# Patient Record
Sex: MALE | HISPANIC OR LATINO | Employment: FULL TIME | URBAN - METROPOLITAN AREA
[De-identification: names, ages, dates, MRNs, and addresses within clinical notes are randomized per-mention and may not be internally consistent; named-entity substitution may affect disease eponyms.]

---
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
## 2023-01-04 NOTE — TELEPHONE ENCOUNTER
Called mom and left a voicemail regarding x-ray needing to be done as a walk in basis on 12/28/20 as no appointment can be scheduled at the time it is needed. Left a call back number of [phone]    elderly f bib family for agitation, worsening dementia/ms. hx of UTIs. no trauma.

## 2023-07-24 NOTE — PROGRESS NOTES
Mother and father at bedside at change of shift. Pt down to CT at 1930. Pt back to floor at 2015. Pt given medication prior to CT and when arrived back to floor pt continues to complain of pain. Pt provided a with a heat pack, once reassessed pt states that pain has gone away. Discussed plan of care and all questions answered at this time.    No

## 2023-07-25 DIAGNOSIS — R63.5 EXCESSIVE BODY WEIGHT GAIN: ICD-10-CM

## 2023-07-25 DIAGNOSIS — Z92.3 PERSONAL HISTORY OF RADIATION THERAPY: ICD-10-CM

## 2023-07-25 DIAGNOSIS — Z85.71 HISTORY OF NODULAR SCLEROSIS HODGKIN'S DISEASE: ICD-10-CM

## 2023-08-02 NOTE — CARE PLAN
Problem: Infection  Goal: Will remain free from infection  Afebrile. [place] accessed.     Problem: Discharge Barriers/Planning  Goal: Patient's continuum of care needs will be met  Discharge instructions and follow up appointments discussed with family.        Yes

## 2023-08-25 ENCOUNTER — HOSPITAL ENCOUNTER (OUTPATIENT)
Dept: RADIOLOGY | Facility: MEDICAL CENTER | Age: 18
End: 2023-08-25
Attending: PEDIATRICS
Payer: COMMERCIAL

## 2023-08-25 DIAGNOSIS — Z85.71 HISTORY OF NODULAR SCLEROSIS HODGKIN'S DISEASE: ICD-10-CM

## 2023-08-25 PROCEDURE — 71046 X-RAY EXAM CHEST 2 VIEWS: CPT

## 2023-08-28 ENCOUNTER — HOSPITAL ENCOUNTER (OUTPATIENT)
Facility: MEDICAL CENTER | Age: 18
End: 2023-08-28
Attending: PEDIATRICS
Payer: COMMERCIAL

## 2023-08-28 ENCOUNTER — HOSPITAL ENCOUNTER (OUTPATIENT)
Dept: PEDIATRIC HEMATOLOGY/ONCOLOGY | Facility: MEDICAL CENTER | Age: 18
End: 2023-08-28
Attending: PEDIATRICS
Payer: COMMERCIAL

## 2023-08-28 VITALS
BODY MASS INDEX: 36.38 KG/M2 | DIASTOLIC BLOOD PRESSURE: 82 MMHG | HEART RATE: 73 BPM | WEIGHT: 245.59 LBS | OXYGEN SATURATION: 98 % | SYSTOLIC BLOOD PRESSURE: 133 MMHG | TEMPERATURE: 98.5 F | HEIGHT: 69 IN

## 2023-08-28 DIAGNOSIS — Z85.71 HISTORY OF NODULAR SCLEROSIS HODGKIN'S DISEASE: Chronic | ICD-10-CM

## 2023-08-28 DIAGNOSIS — R63.5 EXCESSIVE BODY WEIGHT GAIN: ICD-10-CM

## 2023-08-28 LAB
ALBUMIN SERPL BCP-MCNC: 4.7 G/DL (ref 3.2–4.9)
ALBUMIN/GLOB SERPL: 1.5 G/DL
ALP SERPL-CCNC: 86 U/L (ref 30–99)
ALT SERPL-CCNC: 19 U/L (ref 2–50)
ANION GAP SERPL CALC-SCNC: 11 MMOL/L (ref 7–16)
AST SERPL-CCNC: 23 U/L (ref 12–45)
BASOPHILS # BLD AUTO: 0.8 % (ref 0–1.8)
BASOPHILS # BLD: 0.05 K/UL (ref 0–0.12)
BILIRUB SERPL-MCNC: 0.4 MG/DL (ref 0.1–1.5)
BUN SERPL-MCNC: 17 MG/DL (ref 8–22)
CALCIUM ALBUM COR SERPL-MCNC: 9.2 MG/DL (ref 8.5–10.5)
CALCIUM SERPL-MCNC: 9.8 MG/DL (ref 8.5–10.5)
CHLORIDE SERPL-SCNC: 104 MMOL/L (ref 96–112)
CO2 SERPL-SCNC: 24 MMOL/L (ref 20–33)
CREAT SERPL-MCNC: 1.08 MG/DL (ref 0.5–1.4)
EOSINOPHIL # BLD AUTO: 0.27 K/UL (ref 0–0.51)
EOSINOPHIL NFR BLD: 4.1 % (ref 0–6.9)
ERYTHROCYTE [DISTWIDTH] IN BLOOD BY AUTOMATED COUNT: 40 FL (ref 35.9–50)
ERYTHROCYTE [SEDIMENTATION RATE] IN BLOOD BY WESTERGREN METHOD: 7 MM/HOUR (ref 0–20)
GFR SERPLBLD CREATININE-BSD FMLA CKD-EPI: 100 ML/MIN/1.73 M 2
GLOBULIN SER CALC-MCNC: 3.1 G/DL (ref 1.9–3.5)
GLUCOSE SERPL-MCNC: 85 MG/DL (ref 65–99)
HCT VFR BLD AUTO: 46 % (ref 42–52)
HGB BLD-MCNC: 15.7 G/DL (ref 14–18)
IMM GRANULOCYTES # BLD AUTO: 0.02 K/UL (ref 0–0.11)
IMM GRANULOCYTES NFR BLD AUTO: 0.3 % (ref 0–0.9)
LYMPHOCYTES # BLD AUTO: 1.6 K/UL (ref 1–4.8)
LYMPHOCYTES NFR BLD: 24.2 % (ref 22–41)
MCH RBC QN AUTO: 29.3 PG (ref 27–33)
MCHC RBC AUTO-ENTMCNC: 34.1 G/DL (ref 32.3–36.5)
MCV RBC AUTO: 85.8 FL (ref 81.4–97.8)
MONOCYTES # BLD AUTO: 0.5 K/UL (ref 0–0.85)
MONOCYTES NFR BLD AUTO: 7.6 % (ref 0–13.4)
NEUTROPHILS # BLD AUTO: 4.16 K/UL (ref 1.82–7.42)
NEUTROPHILS NFR BLD: 63 % (ref 44–72)
NRBC # BLD AUTO: 0 K/UL
NRBC BLD-RTO: 0 /100 WBC (ref 0–0.2)
PLATELET # BLD AUTO: 256 K/UL (ref 164–446)
PMV BLD AUTO: 9 FL (ref 9–12.9)
POTASSIUM SERPL-SCNC: 3.8 MMOL/L (ref 3.6–5.5)
PROT SERPL-MCNC: 7.8 G/DL (ref 6–8.2)
RBC # BLD AUTO: 5.36 M/UL (ref 4.7–6.1)
SODIUM SERPL-SCNC: 139 MMOL/L (ref 135–145)
TSH SERPL DL<=0.005 MIU/L-ACNC: 3.49 UIU/ML (ref 0.38–5.33)
WBC # BLD AUTO: 6.6 K/UL (ref 4.8–10.8)

## 2023-08-28 PROCEDURE — 80053 COMPREHEN METABOLIC PANEL: CPT

## 2023-08-28 PROCEDURE — 99212 OFFICE O/P EST SF 10 MIN: CPT | Performed by: PEDIATRICS

## 2023-08-28 PROCEDURE — 85025 COMPLETE CBC W/AUTO DIFF WBC: CPT

## 2023-08-28 PROCEDURE — 85652 RBC SED RATE AUTOMATED: CPT

## 2023-08-28 PROCEDURE — 36415 COLL VENOUS BLD VENIPUNCTURE: CPT

## 2023-08-28 PROCEDURE — 84443 ASSAY THYROID STIM HORMONE: CPT

## 2023-08-28 PROCEDURE — 99214 OFFICE O/P EST MOD 30 MIN: CPT | Performed by: PEDIATRICS

## 2023-08-28 NOTE — PROGRESS NOTES
Pediatric Hematology/Oncology   Clinic Visit      Patient Name:  Yao Turner  : 2005   MRN: [id number]    Location of Service: [facility]    Date of Service: 2023  Time: 1:22 PM    Primary Care Physician: Jarrell Cardoso M.D.    Referring Physician: Jarrell Cardoso M.D.    Patient Active Problem List   Diagnosis    Excessive body weight gain    History of nodular sclerosis Hodgkin's disease    Personal history of radiation therapy       HISTORY OF PRESENT ILLNESS:     Chief Complaint: Scheduled follow-up     History of Present Illness: Yao Turner is a 18 y.o. male who is followed at the [facility] - Pediatric Hematology/Oncology for a diagnosis of Stage 4B Hodgkin's disease.  Yao presents to clinic with his father.      Since his last visit with me a year ago, Yao reports that he has done well.  He is exercising regularly and trying to lose a little weight.  He voices no complaints or concerns.    Review of Systems:     Constitutional: Afebrile.  Without recent illness.  Energy and appetite are good.   HENT: Negative for URI symptoms.  Eyes: Negative for visual changes.  Respiratory: Negative for shortness of breath or noisy breathing.   Cardiovascular: Negative for chest pain, palpitations.  Gastrointestinal: Negative for nausea, abdominal pain, diarrhea, constipation.    Musculoskeletal: Negative for joint or muscle pain or swelling.    Skin: Negative for rash, signs of infection.  Endo/Heme/Allergies: Does not bruise/bleed easily.    Psychiatric/Behavioral: No changes in mood, appropriate for age.       PAST MEDICAL HISTORY:     Past Medical History:    Past Medical History:   Diagnosis Date    Anemia     Cancer (HCC)     Hodgkin's lymphoma (HCC)       Past Surgical History:     Past Surgical History:   Procedure Laterality Date    CATH REMOVAL  2018    Procedure: CATH REMOVAL-PORT;  Surgeon: Betty Brito M.D.;  Location:  "SURGERY SAME DAY [facility];  Service: General    CATH PLACEMENT N/A 3/8/2018    Procedure: CATH PLACEMENT/ PORT;  Surgeon: Betty Brito M.D.;  Location: SURGERY [facility];  Service: General    OTHER  2014    tooth removal due to  abcess      Birth/Developmental History:  No birth history on file.     Allergies:   Allergies as of 08/28/2023    (No Known Allergies)       Home Medications:    No current outpatient medications on file.     No current facility-administered medications for this encounter.      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  Yao still plans to attend [employer] to obtain a business degree.      OBJECTIVE:     Vitals:   /82 (BP Location: Right arm, Patient Position: Sitting, BP Cuff Size: Adult long)   Pulse 73   Temp 36.9 °C (98.5 °F) (Temporal)   Ht 1.75 m (5' 8.9\")   Wt 111 kg (245 lb 9.5 oz)   SpO2 98%     Labs:   Latest Reference Range & Units 08/28/23 13:46   WBC 4.8 - 10.8 K/uL 6.6   RBC 4.70 - 6.10 M/uL 5.36   Hemoglobin 14.0 - 18.0 g/dL 15.7   Hematocrit 42.0 - 52.0 % 46.0   MCV 81.4 - 97.8 fL 85.8   MCH 27.0 - 33.0 pg 29.3   MCHC 32.3 - 36.5 g/dL 34.1   RDW 35.9 - 50.0 fL 40.0   Platelet Count 164 - 446 K/uL 256   MPV 9.0 - 12.9 fL 9.0   Neutrophils-Polys 44.00 - 72.00 % 63.00   Neutrophils (Absolute) 1.82 - 7.42 K/uL 4.16   Lymphocytes 22.00 - 41.00 % 24.20   Lymphs (Absolute) 1.00 - 4.80 K/uL 1.60   Monocytes 0.00 - 13.40 % 7.60   Monos (Absolute) 0.00 - 0.85 K/uL 0.50   Eosinophils 0.00 - 6.90 % 4.10   Eos (Absolute) 0.00 - 0.51 K/uL 0.27   Basophils 0.00 - 1.80 % 0.80   Baso (Absolute) 0.00 - 0.12 K/uL 0.05   Immature Granulocytes 0.00 - 0.90 % 0.30   Immature Granulocytes (abs) 0.00 - 0.11 K/uL 0.02   Sed Rate Westergren 0 - 20 mm/hour 7   Sodium 135 - 145 mmol/L 139   Potassium 3.6 - 5.5 mmol/L 3.8   Chloride 96 - 112 mmol/L 104   Co2 20 - 33 mmol/L 24   Anion Gap 7.0 - 16.0  11.0   Glucose 65 - 99 mg/dL 85   Bun 8 - 22 mg/dL 17   Creatinine 0.50 " - 1.40 mg/dL 1.08   GFR (CKD-EPI) >60 mL/min/1.73 m 2 100   Calcium 8.5 - 10.5 mg/dL 9.8   Correct Calcium 8.5 - 10.5 mg/dL 9.2   AST(SGOT) 12 - 45 U/L 23   ALT(SGPT) 2 - 50 U/L 19   Alkaline Phosphatase 30 - 99 U/L 86   Total Bilirubin 0.1 - 1.5 mg/dL 0.4   Albumin 3.2 - 4.9 g/dL 4.7   Total Protein 6.0 - 8.2 g/dL 7.8   Globulin 1.9 - 3.5 g/dL 3.1   A-G Ratio g/dL 1.5   TSH 0.380 - 5.330 uIU/mL 3.490     Imaging:  Reading Physician Reading Date Result Priority   Musa Marshall M.D. 8/25/2023 Routine     Narrative & Impression     8/25/2023 4:17 PM     HISTORY/REASON FOR EXAM:  Hx of Hodgkin disease.        TECHNIQUE/EXAM DESCRIPTION AND NUMBER OF VIEWS:  Two views of the chest.     COMPARISON:  9/1/2022     FINDINGS:  The heart is normal in size.  No pulmonary infiltrates or consolidations are noted.  No pleural effusions are appreciated.        IMPRESSION:     No airspace consolidation.        Physical Exam:    Constitutional: Well-developed, well-nourished, and in no distress.  Overweight but well appearing.  HENT: Normocephalic and atraumatic. No nasal congestion or rhinorrhea. Oropharynx is clear and moist. No oral ulcerations or sores.    Eyes: Conjunctivae are normal. Pupils are equal, round, and reactive to light. No scleral icterus.  Neck: Normal range of motion of neck, no adenopathy.    Cardiovascular: Normal rate, regular rhythm and normal heart sounds.  No murmur heard. DP/radial pulses 2+, cap refill < 2 sec  Pulmonary/Chest: Effort normal and breath sounds normal. No respiratory distress. Symmetric expansion.  No crackles or wheezes.  Abdomen: Soft. Bowel sounds are normal. No distension and no mass. There is no hepatosplenomegaly.    Genitourinary:  Deferred  Musculoskeletal: Normal range of motion of lower and upper extremities bilaterally. No tenderness to palpation of elbows, wrists, hands, knees, ankles and feet bilaterally.   Lymphadenopathy: No cervical adenopathy, axillary adenopathy or inguinal  "adenopathy.   Neurological: Alert and oriented to person and place. Exhibits normal muscle tone bilaterally in upper and lower extremities. Gait normal. Coordination normal.    Skin: Skin is warm, dry and pink.  No rash or evidence of skin infection.  No pallor.   Psychiatric: Mood and affect normal for age.      ASSESSMENT AND PLAN:     Problem List Items Addressed This Visit       Excessive body weight gain (Chronic)      Yao has lost 7 kg over the last year!  He attributes this to more regular exercise and \"trying\" to limit high-calorie foods (snacks).  I offered encouragement.     Of note, his TSH was mildly elevated (5.86 uIU/mL: likely not significant) last year but has now normalized.      History of nodular sclerosis Hodgkin's disease (Chronic)      Yao completed 5 cycles of ABVE-PC chemotherapy in June 2018.  This was followed by a course of radiation therapy to his mediastinum (IMRT 24 Gy in 14 fractions) in July of that year.  He has now been of treatment for more than 5 years and continues in an apparent remission.      Once again today, we discussed possible late effects of Yao's prior therapies.  These include:    1) Myocardial damage.  His cumulative dose of doxorubicin was only 250 mg/m2, which is unlikely to cause significant injury, but he also received chest XRT and cyclophosphamide (6 g/m2, cumulatively).  His most recent echocardiogram in Sept 2021 was unremarkable.  This should be repeated every 5 years, moving forward.    2) Compromised fertility.  Exposure to cyclophosphamide was relatively limited.  Yao could consider semen analysis at any time to assess his sperm production.  He's not interested at this time.    3) Second malignancies.  In addition to cyclophosphamide, Yao received etoposide at a cumulative dose of 1875 mg/m2.  He is at a small but significant risk for second malignancies, including sarcomas and leukemias.       Thankfully, these issues are, statistically speaking, " "unlikely to arise.  I reminded Yao (not for the first time) that he can mitigate these risks by trying to live as healthy a life as possible.  This incldues exercise, watching his weight, avoiding tobacco products, limiting alcohol intake, etc.    There is also a small possibility, even at this point, that his Hodgkin's disease could recur.  In such a case, there are good options for \"salvage\" therapy.      Relevant Orders    CBC WITH DIFFERENTIAL (Completed)    Comp Metabolic Panel (Completed)    TSH (Completed)    Sed Rate (Completed)     Unless there are new concerns, Yao will RTC in a year for ongoing surveillance.    Total time today approx 30 minutes, of which > 50% was spent on counseling and coordination of care.    RAMESH Doss MD  Pediatric Hematology / Oncology  [facility]  Cell.  [phone]  Office. [phone]            "

## 2024-08-27 ENCOUNTER — APPOINTMENT (OUTPATIENT)
Dept: PEDIATRIC HEMATOLOGY/ONCOLOGY | Facility: MEDICAL CENTER | Age: 19
End: 2024-08-27
Attending: PEDIATRICS
Payer: COMMERCIAL

## 2024-08-30 NOTE — PROGRESS NOTES
Bed: 21  Expected date:   Expected time:   Means of arrival:   Comments:  Intake 1     Chemo meds up from pharmacy. Urine spec grav only 1.002. Another specimen collected and sent.

## 2024-09-24 ENCOUNTER — APPOINTMENT (OUTPATIENT)
Dept: PEDIATRIC HEMATOLOGY/ONCOLOGY | Facility: MEDICAL CENTER | Age: 19
End: 2024-09-24
Attending: PEDIATRICS
Payer: COMMERCIAL

## 2024-10-01 ENCOUNTER — HOSPITAL ENCOUNTER (OUTPATIENT)
Dept: PEDIATRIC HEMATOLOGY/ONCOLOGY | Facility: MEDICAL CENTER | Age: 19
End: 2024-10-01
Attending: PEDIATRICS
Payer: COMMERCIAL

## 2024-10-01 ENCOUNTER — HOSPITAL ENCOUNTER (OUTPATIENT)
Facility: MEDICAL CENTER | Age: 19
End: 2024-10-01
Attending: PEDIATRICS
Payer: COMMERCIAL

## 2024-10-01 VITALS
OXYGEN SATURATION: 98 % | WEIGHT: 203.48 LBS | TEMPERATURE: 97.6 F | HEIGHT: 70 IN | DIASTOLIC BLOOD PRESSURE: 85 MMHG | BODY MASS INDEX: 29.13 KG/M2 | SYSTOLIC BLOOD PRESSURE: 138 MMHG | HEART RATE: 68 BPM

## 2024-10-01 DIAGNOSIS — Z85.71 HISTORY OF NODULAR SCLEROSIS HODGKIN'S DISEASE: Chronic | ICD-10-CM

## 2024-10-01 DIAGNOSIS — Z92.3 PERSONAL HISTORY OF RADIATION THERAPY: Chronic | ICD-10-CM

## 2024-10-01 LAB
ALBUMIN SERPL BCP-MCNC: 4.5 G/DL (ref 3.2–4.9)
ALBUMIN/GLOB SERPL: 1.6 G/DL
ALP SERPL-CCNC: 82 U/L (ref 30–99)
ALT SERPL-CCNC: 28 U/L (ref 2–50)
ANION GAP SERPL CALC-SCNC: 15 MMOL/L (ref 7–16)
AST SERPL-CCNC: 28 U/L (ref 12–45)
BASOPHILS # BLD AUTO: 1.2 % (ref 0–1.8)
BASOPHILS # BLD: 0.06 K/UL (ref 0–0.12)
BILIRUB SERPL-MCNC: 0.5 MG/DL (ref 0.1–1.5)
BUN SERPL-MCNC: 26 MG/DL (ref 8–22)
CALCIUM ALBUM COR SERPL-MCNC: 9.2 MG/DL (ref 8.5–10.5)
CALCIUM SERPL-MCNC: 9.6 MG/DL (ref 8.5–10.5)
CHLORIDE SERPL-SCNC: 104 MMOL/L (ref 96–112)
CO2 SERPL-SCNC: 21 MMOL/L (ref 20–33)
CREAT SERPL-MCNC: 0.98 MG/DL (ref 0.5–1.4)
EOSINOPHIL # BLD AUTO: 0.33 K/UL (ref 0–0.51)
EOSINOPHIL NFR BLD: 6.5 % (ref 0–6.9)
ERYTHROCYTE [DISTWIDTH] IN BLOOD BY AUTOMATED COUNT: 43.7 FL (ref 35.9–50)
GFR SERPLBLD CREATININE-BSD FMLA CKD-EPI: 112 ML/MIN/1.73 M 2
GLOBULIN SER CALC-MCNC: 2.9 G/DL (ref 1.9–3.5)
GLUCOSE SERPL-MCNC: 82 MG/DL (ref 65–99)
HCT VFR BLD AUTO: 45 % (ref 42–52)
HGB BLD-MCNC: 15.7 G/DL (ref 14–18)
IMM GRANULOCYTES # BLD AUTO: 0.01 K/UL (ref 0–0.11)
IMM GRANULOCYTES NFR BLD AUTO: 0.2 % (ref 0–0.9)
LYMPHOCYTES # BLD AUTO: 1.74 K/UL (ref 1–4.8)
LYMPHOCYTES NFR BLD: 34.1 % (ref 22–41)
MCH RBC QN AUTO: 31.3 PG (ref 27–33)
MCHC RBC AUTO-ENTMCNC: 34.9 G/DL (ref 32.3–36.5)
MCV RBC AUTO: 89.6 FL (ref 81.4–97.8)
MONOCYTES # BLD AUTO: 0.42 K/UL (ref 0–0.85)
MONOCYTES NFR BLD AUTO: 8.2 % (ref 0–13.4)
NEUTROPHILS # BLD AUTO: 2.55 K/UL (ref 1.82–7.42)
NEUTROPHILS NFR BLD: 49.8 % (ref 44–72)
NRBC # BLD AUTO: 0 K/UL
NRBC BLD-RTO: 0 /100 WBC (ref 0–0.2)
PLATELET # BLD AUTO: 211 K/UL (ref 164–446)
PMV BLD AUTO: 9.1 FL (ref 9–12.9)
POTASSIUM SERPL-SCNC: 4.1 MMOL/L (ref 3.6–5.5)
PROT SERPL-MCNC: 7.4 G/DL (ref 6–8.2)
RBC # BLD AUTO: 5.02 M/UL (ref 4.7–6.1)
SODIUM SERPL-SCNC: 140 MMOL/L (ref 135–145)
WBC # BLD AUTO: 5.1 K/UL (ref 4.8–10.8)

## 2024-10-01 PROCEDURE — 85025 COMPLETE CBC W/AUTO DIFF WBC: CPT

## 2024-10-01 PROCEDURE — 36415 COLL VENOUS BLD VENIPUNCTURE: CPT

## 2024-10-01 PROCEDURE — 99213 OFFICE O/P EST LOW 20 MIN: CPT

## 2024-10-01 PROCEDURE — 99214 OFFICE O/P EST MOD 30 MIN: CPT | Performed by: PEDIATRICS

## 2024-10-01 PROCEDURE — 80053 COMPREHEN METABOLIC PANEL: CPT

## 2024-10-01 ASSESSMENT — FIBROSIS 4 INDEX: FIB4 SCORE: 0.43

## 2024-10-04 PROBLEM — R63.5 EXCESSIVE BODY WEIGHT GAIN: Chronic | Status: RESOLVED | Noted: 2018-12-28 | Resolved: 2024-10-04

## 2024-11-27 DIAGNOSIS — Z85.71 HISTORY OF NODULAR SCLEROSIS HODGKIN'S DISEASE: Chronic | ICD-10-CM

## 2024-12-09 ENCOUNTER — ANCILLARY PROCEDURE (OUTPATIENT)
Dept: CARDIOLOGY | Facility: MEDICAL CENTER | Age: 19
End: 2024-12-09
Attending: PEDIATRICS
Payer: COMMERCIAL

## 2024-12-09 DIAGNOSIS — Z85.71 HISTORY OF NODULAR SCLEROSIS HODGKIN'S DISEASE: Chronic | ICD-10-CM

## 2024-12-09 PROCEDURE — 93306 TTE W/DOPPLER COMPLETE: CPT

## 2024-12-10 LAB
LV EJECT FRACT  99904: 65
LV EJECT FRACT MOD 2C 99903: 64.62
LV EJECT FRACT MOD 4C 99902: 67.56
LV EJECT FRACT MOD BP 99901: 63.84

## 2024-12-10 PROCEDURE — 93306 TTE W/DOPPLER COMPLETE: CPT | Mod: 26 | Performed by: INTERNAL MEDICINE

## (undated) DEVICE — PROTECTOR ULNA NERVE - (36PR/CA)

## (undated) DEVICE — SUTURE 2-0 PROLENE SH (36PK/BX)

## (undated) DEVICE — SENSOR SPO2 NEO LNCS ADHESIVE (20/BX) SEE USER NOTES

## (undated) DEVICE — ELECTRODE 850 FOAM ADHESIVE - HYDROGEL RADIOTRNSPRNT (50/PK)

## (undated) DEVICE — TUBE CONNECTING SUCTION - CLEAR PLASTIC STERILE 72 IN (50EA/CA)

## (undated) DEVICE — SUTURE 2-0 ETHILON FS - (36/BX) 18 INCH

## (undated) DEVICE — BLADE SURGICAL #15 - (50/BX 3BX/CA)

## (undated) DEVICE — SYRINGE SAFETY 5 ML 18 GA X 1-1/2 BLUNT LL (100/BX 4BX/CA)

## (undated) DEVICE — ELECTRODE DUAL RETURN W/ CORD - (50/PK)

## (undated) DEVICE — CATHETER IV 20 GA X 1-1/4 ---SURG.& SDS ONLY--- (50EA/BX)

## (undated) DEVICE — TUBING CLEARLINK DUO-VENT - C-FLO (48EA/CA)

## (undated) DEVICE — SUTURE 4-0 VICRYL PLUS FS-2 - 27 INCH (36/BX)

## (undated) DEVICE — LACTATED RINGERS INJ 1000 ML - (14EA/CA 60CA/PF)

## (undated) DEVICE — SUCTION INSTRUMENT YANKAUER BULBOUS TIP W/O VENT (50EA/CA)

## (undated) DEVICE — SUTURE GENERAL

## (undated) DEVICE — CANISTER SUCTION 3000ML MECHANICAL FILTER AUTO SHUTOFF MEDI-VAC NONSTERILE LF DISP  (40EA/CA)

## (undated) DEVICE — PACK MINOR BASIN - (2EA/CA)

## (undated) DEVICE — HEAD HOLDER JUNIOR/ADULT

## (undated) DEVICE — GLOVE BIOGEL SZ 6.5 SURGICAL PF LTX (50PR/BX 4BX/CA)

## (undated) DEVICE — SHEET TRANSVERSE LAP - (12EA/CA)

## (undated) DEVICE — CHLORAPREP 26 ML APPLICATOR - ORANGE TINT(25/CA)

## (undated) DEVICE — SET LEADWIRE 5 LEAD BEDSIDE DISPOSABLE ECG (1SET OF 5/EA)

## (undated) DEVICE — BAG DECANTER (50EA/CS)

## (undated) DEVICE — KIT ANESTHESIA W/CIRCUIT & 3/LT BAG W/FILTER (20EA/CA)

## (undated) DEVICE — GOWN WARMING STANDARD FLEX - (30/CA)

## (undated) DEVICE — SPONGE GAUZESTER. 2X2 4-PL - (2/PK 50PK/BX 30BX/CS)

## (undated) DEVICE — KIT  I.V. START (100EA/CA)

## (undated) DEVICE — SET EXTENSION WITH 2 PORTS (48EA/CA) ***PART #2C8610 IS A SUBSTITUTE*****

## (undated) DEVICE — MASK, LARYNGEAL AIRWAY #4

## (undated) DEVICE — TRAY SKIN SCRUB PVP WET (20EA/CA) PART #DYND70356 DISCONTINUED

## (undated) DEVICE — DRESSING TRANSPARENT FILM TEGADERM 4 X 4.75" (50EA/BX)"

## (undated) DEVICE — SHEET THYROID - (10EA/CA)

## (undated) DEVICE — SOD. CHL. INJ. 0.9% 250 ML - (36/CA 50CA/PF)

## (undated) DEVICE — GLOVE BIOGEL INDICATOR SZ 6.5 SURGICAL PF LTX - (50PR/BX 4BX/CA)

## (undated) DEVICE — NEEDLE NON SAFETY 25 GA X 1 1/2 IN HYPO (100EA/BX)

## (undated) DEVICE — WATER IRRIGATION STERILE 1000ML (12EA/CA)

## (undated) DEVICE — DRAPE C-ARM LARGE 41IN X 74 IN - (10/BX 2BX/CA)

## (undated) DEVICE — DRESSING TRANSPARENT FILM TEGADERM 2.375 X 2.75"  (100EA/BX)"

## (undated) DEVICE — GLOVE BIOGEL PI INDICATOR SZ 7.0 SURGICAL PF LF - (50/BX 4BX/CA)

## (undated) DEVICE — SODIUM CHL IRRIGATION 0.9% 1000ML (12EA/CA)

## (undated) DEVICE — STERI STRIP COMPOUND BENZOIN - TINCTURE 0.6ML WITH APPLICATOR (40EA/BX)

## (undated) DEVICE — CANISTER SUCTION RIGID RED 1500CC (40EA/CA)

## (undated) DEVICE — CLOSURE SKIN STRIP 1/2 X 4 IN - (STERI STRIP) (50/BX 4BX/CA)

## (undated) DEVICE — CLOSURE WOUND 1/4 X 4 (STERI - STRIP) (50/BX 4BX/CA)

## (undated) DEVICE — SUTURE 3-0 VICRYL PLUS SH - 8X 18 INCH (12/BX)

## (undated) DEVICE — KIT ROOM DECONTAMINATION

## (undated) DEVICE — SUTURE 3-0 SILK FS 18 INCH - (12PK/BX)

## (undated) DEVICE — MASK ANESTHESIA ADULT  - (100/CA)

## (undated) DEVICE — NEPTUNE 4 PORT MANIFOLD - (20/PK)

## (undated) DEVICE — GLOVE BIOGEL PI ORTHO SZ 6 1/2 SURGICAL PF LF (40PR/BX)

## (undated) DEVICE — SWABSTICK BENZOIN SINGLE (50EA/BX)

## (undated) DEVICE — SLEEVE, VASO, THIGH, MED